# Patient Record
Sex: MALE | Race: BLACK OR AFRICAN AMERICAN
[De-identification: names, ages, dates, MRNs, and addresses within clinical notes are randomized per-mention and may not be internally consistent; named-entity substitution may affect disease eponyms.]

---

## 2018-08-16 ENCOUNTER — HOSPITAL ENCOUNTER (EMERGENCY)
Dept: HOSPITAL 97 - ER | Age: 28
Discharge: HOME | End: 2018-08-16
Payer: COMMERCIAL

## 2018-08-16 DIAGNOSIS — E86.0: Primary | ICD-10-CM

## 2018-08-16 LAB
ALBUMIN SERPL BCP-MCNC: 4.7 G/DL (ref 3.4–5)
ALP SERPL-CCNC: 91 U/L (ref 45–117)
ALT SERPL W P-5'-P-CCNC: 36 U/L (ref 12–78)
AMYLASE SERPL-CCNC: 63 U/L (ref 25–115)
AST SERPL W P-5'-P-CCNC: 36 U/L (ref 15–37)
BUN BLD-MCNC: 15 MG/DL (ref 7–18)
BUN BLD-MCNC: 18 MG/DL (ref 7–18)
CAOX CRY URNS QL MICRO: (no result)
GLUCOSE SERPLBLD-MCNC: 102 MG/DL (ref 74–106)
GLUCOSE SERPLBLD-MCNC: 96 MG/DL (ref 74–106)
HCT VFR BLD CALC: 44.4 % (ref 39.6–49)
LIPASE SERPL-CCNC: 126 U/L (ref 73–393)
LYMPHOCYTES # SPEC AUTO: 1.8 K/UL (ref 0.7–4.9)
MCH RBC QN AUTO: 32 PG (ref 27–35)
MCV RBC: 91.5 FL (ref 80–100)
PMV BLD: 8.1 FL (ref 7.6–11.3)
POTASSIUM SERPL-SCNC: 3.9 MMOL/L (ref 3.5–5.1)
POTASSIUM SERPL-SCNC: 4.3 MMOL/L (ref 3.5–5.1)
RBC # BLD: 4.85 M/UL (ref 4.33–5.43)
UA COMPLETE W REFLEX CULTURE PNL UR: (no result)

## 2018-08-16 PROCEDURE — 99284 EMERGENCY DEPT VISIT MOD MDM: CPT

## 2018-08-16 PROCEDURE — 96361 HYDRATE IV INFUSION ADD-ON: CPT

## 2018-08-16 PROCEDURE — 80048 BASIC METABOLIC PNL TOTAL CA: CPT

## 2018-08-16 PROCEDURE — 96374 THER/PROPH/DIAG INJ IV PUSH: CPT

## 2018-08-16 PROCEDURE — 76377 3D RENDER W/INTRP POSTPROCES: CPT

## 2018-08-16 PROCEDURE — 74176 CT ABD & PELVIS W/O CONTRAST: CPT

## 2018-08-16 PROCEDURE — 82550 ASSAY OF CK (CPK): CPT

## 2018-08-16 PROCEDURE — 82150 ASSAY OF AMYLASE: CPT

## 2018-08-16 PROCEDURE — 81003 URINALYSIS AUTO W/O SCOPE: CPT

## 2018-08-16 PROCEDURE — 80076 HEPATIC FUNCTION PANEL: CPT

## 2018-08-16 PROCEDURE — 36415 COLL VENOUS BLD VENIPUNCTURE: CPT

## 2018-08-16 PROCEDURE — 83690 ASSAY OF LIPASE: CPT

## 2018-08-16 PROCEDURE — 81015 MICROSCOPIC EXAM OF URINE: CPT

## 2018-08-16 PROCEDURE — 85025 COMPLETE CBC W/AUTO DIFF WBC: CPT

## 2018-08-16 NOTE — EDPHYS
Physician Documentation                                                                           

 [facility]                                                                

Name: Kelvin Hopkins                                                                               

Age: 27 yrs                                                                                       

Sex: Male                                                                                         

: 1990                                                                                   

MRN: [id number]                                                                                   

Arrival Date: 2018                                                                          

Time: 19:04                                                                                       

Account#: [id number]                                                                            

Bed 25                                                                                            

Private MD: None, None                                                                            

ED Physician Nehemiah Dawn                                                                      

HPI:                                                                                              

                                                                                             

19:53 This 27 yrs old Black Male presents to ER via Ambulatory with complaints of             jmm 

      Vomiting/Fatigue.                                                                           

19:53 Onset: The symptoms/episode began/occurred gradually, 2 week(s) ago. This is a 27 year  jmm 

      old male with no chronic medical conditions that presents to the ED with fatigue,           

      vomiting, left flank pain beginning approx 2 weeks ago. The patient states that he          

      works outside and believes he may be dehydrated. Patient denies abdominal pain but          

      states that he will develop left flank pain with cramping. Patient states he has not        

      urinated today. .                                                                           

                                                                                                  

Historical:                                                                                       

- Allergies:                                                                                      

19:10 No Known Allergies;                                                                     aj  

- Home Meds:                                                                                      

19:10 None [Active];                                                                          aj  

- PMHx:                                                                                           

19:10 None;                                                                                   aj  

- PSHx:                                                                                           

19:10 None;                                                                                   aj  

                                                                                                  

- Immunization history:: Adult Immunizations up to date.                                          

- Social history:: Smoking status: Patient/guardian denies using tobacco.                         

- Ebola Screening: : Patient negative for fever greater than or equal to 101.5 degrees            

  Fahrenheit, and additional compatible Ebola Virus Disease symptoms Patient denies               

  exposure to infectious person Patient denies travel to an Ebola-affected area in the            

  21 days before illness onset No symptoms or risks identified at this time.                      

                                                                                                  

                                                                                                  

ROS:                                                                                              

19:57 Cardiovascular: Negative for chest pain, palpitations, and edema, Respiratory: Negative jmm 

      for shortness of breath, cough, wheezing, and pleuritic chest pain.                         

19:57 Constitutional: Positive for fatigue.                                                       

19:57 Abdomen/GI: Positive for nausea and vomiting, flank pain.                                   

19:57 Neuro: Positive for weakness.                                                               

19:57 All other systems are negative.                                                             

                                                                                                  

Exam:                                                                                             

19:57 Constitutional:  This is a well developed, well nourished patient who is awake, alert,  jmm 

      and in no acute distress. Head/Face:  atraumatic. Chest/axilla:  Normal chest wall          

      appearance and motion.   Cardiovascular:  Regular rate and rhythm.  No edema                

      appreciated Respiratory:  Normal respirations, no respiratory distress appreciated          

      Abdomen/GI:  Non distended, soft                                                            

19:57 Back: CVA tenderness, is absent.                                                            

19:57 Musculoskeletal/extremity: ROM: intact in all extremities.                                  

19:57 Skin: Appearance: Color: normal in color.                                                   

19:57 Neuro: Orientation: is normal, Mentation: is normal, Memory: is normal.                     

19:57 Psych: Behavior/mood is pleasant, cooperative.                                              

                                                                                                  

Vital Signs:                                                                                      

19:10  / 95; Pulse 92; Resp 16; Temp 99.0; Pulse Ox 99% on R/A; Weight 92.99 kg; Height aj  

      5 ft. 11 in. (180.34 cm) (M);                                                               

20:59  / 83; Pulse 81; Resp 17; Pulse Ox 100% on R/A; Pain 2/10;                        ed1 

22:00  / 80; Pulse 76; Resp 18; Pulse Ox 100% on R/A; Pain 4/10;                        ed1 

23:13  / 76; Pulse 83; Resp 17; Pulse Ox 100% on R/A; Pain 4/10;                        ed1 

19:10 Body Mass Index 28.59 (92.99 kg, 180.34 cm)                                             aj  

                                                                                                  

MDM:                                                                                              

19:52 Patient medically screened.                                                             [facility] 

22:37 Data reviewed: vital signs, nurses notes, lab test result(s), radiologic studies, CT    [facility] 

      scan. Transition of care: After a detail discussion of the patient's case, care is          

      transferred to Nehemiha CASTILLO.                                                               

                                                                                                  

                                                                                             

19:53 Order name: Amylase, Serum; Complete Time: 21:13                                        [facility] 

                                                                                             

19:53 Order name: Basic Metabolic Panel; Complete Time: 21:13                                 [facility] 

                                                                                             

19:53 Order name: CBC with Diff; Complete Time: 20:40                                         [facility] 

                                                                                             

19:53 Order name: Creatinine for Radiology; Complete Time: 21:13                              [facility] 

                                                                                             

19:53 Order name: Hepatic Function; Complete Time: 21:13                                      [facility] 

                                                                                             

19:53 Order name: Lipase; Complete Time: 21:13                                                [facility] 

                                                                                             

19:53 Order name: Urine Microscopic Only; Complete Time: 21:13                                [facility] 

                                                                                             

19:53 Order name: CPK; Complete Time: 21:13                                                   [facility] 

                                                                                             

20:48 Order name: Urine Dipstick--Ancillary (enter results); Complete Time: 21:13             ms  

                                                                                             

21:19 Order name: CT Stone Protocol; Complete Time: 21:51                                     [facility] 

                                                                                             

22:10 Order name: BMP; Complete Time: 23:24                                                   [facility] 

                                                                                             

19:53 Order name: IV Saline Lock; Complete Time: 20:29                                        [facility] 

                                                                                             

19:53 Order name: Labs collected and sent; Complete Time: 20:29                               [facility] 

                                                                                             

19:53 Order name: Urine Dipstick-Ancillary (obtain specimen); Complete Time: 20:29            [facility] 

                                                                                                  

Administered Medications:                                                                         

20:26 Drug: NS 0.9% 1000 ml Route: IV; Rate: 1 bolus; Site: left antecubital;                 ed1 

21:24 Follow up: IV Status: Completed infusion; IV Intake: 1000ml                             ed1 

20:28 Drug: Zofran 4 mg Route: IVP; Site: left antecubital;                                   bb  

21:10 Follow up: Response: No adverse reaction; Nausea is decreased                           ed1 

21:24 Drug: NS 0.9% 1000 ml Route: IV; Rate: 1 bolus; Site: left antecubital;                 ed1 

23:49 Follow up: IV Status: Completed infusion                                                rv  

                                                                                                  

                                                                                                  

Disposition:                                                                                      

                                                                                             

07:27 Co-signature as Attending Physician, Nehemiah Dawn MD I agree with the assessment and  [facility] 

      plan of care.                                                                               

                                                                                                  

Disposition:                                                                                      

18 23:28 Discharged to Home. Impression: Dehydration.                                       

- Condition is Stable.                                                                            

- Discharge Instructions: Dehydration, Adult, Form - Excuse from Work, School, or                 

  Physical Activity.                                                                              

                                                                                                  

- Medication Reconciliation Form, Thank You Letter, Antibiotic Education, Prescription            

  Opioid Use, Work release form form.                                                             

- Follow up: Private Physician; When: 1 - 2 days; Reason: Recheck today's complaints.             

- Problem is new.                                                                                 

- Symptoms have improved.                                                                         

                                                                                                  

                                                                                                  

                                                                                                  

Signatures:                                                                                       

Dispatcher [employer]                           EDMS                                                 

Sofia Olmedo RN RN aj Anderson, Corey, MD MD cha Mickail, Joel, PA PA jmm Ballard, Brenda RN                     RN   Maria A Garcia, LUCYN                       LVN  ed1                                                  

Nehemiah Joy PA PA cp Vicente, Ronaldo, RN                    RN   rv                                                   

                                                                                                  

Corrections: (The following items were deleted from the chart)                                    

                                                                                             

23:49 23:28 2018 23:28 Discharged to Home. Impression: Dehydration. Condition is        rv  

      Stable. Forms are Medication Reconciliation Form, Thank You Letter, Antibiotic              

      Education, Prescription Opioid Use. Follow up: Private Physician; When: 1 - 2 days;         

      Reason: Recheck today's complaints. Problem is new. Symptoms have improved. cp              

                                                                                                  

**************************************************************************************************

## 2018-08-16 NOTE — RAD REPORT
EXAM DESCRIPTION:  CT - Stone Protocol - 8/16/2018 9:30 pm

 

CLINICAL HISTORY:  Flank pain.

FLANK PAIN

 

COMPARISON:  No comparisons

 

TECHNIQUE:  Axial images were obtained without oral or IV contrast. Lack of contrast limits solid org
an and vascular assessment. The field-of-view spans the entirety of the  system partially obscuring
 uppermost abdomen and lung bases. Coronal reformatted images were obtained and reviewed.

 

All CT scans are performed using dose optimization technique as appropriate and may include automated
 exposure control or mA/KV adjustment according to patient size.

 

FINDINGS:  The lower lung fields are clear.

 

Imaged portions of the liver and spleen show no suspicious findings on non-contrast imaging. The panc
reas and adrenal glands are normal. No pathologic lymphadenopathy in the abdomen or pelvis.

 

No urinary tract stones or obstructive uropathy.

 

No bowel obstruction, free air, free fluid or abscess. Normal appendix noted.

 

No significant bony abnormality.

 

IMPRESSION:  No urinary tract stones or obstructive uropathy.

## 2018-08-16 NOTE — ER
Nurse's Notes                                                                                     

 [facility]                                                                

Name: Kelvin Hopkins                                                                               

Age: 27 yrs                                                                                       

Sex: Male                                                                                         

: 1990                                                                                   

MRN: [id number]                                                                                   

Arrival Date: 2018                                                                          

Time: 19:04                                                                                       

Account#: [id number]                                                                            

Bed 25                                                                                            

Private MD: None, None                                                                            

Diagnosis: Dehydration                                                                            

                                                                                                  

Presentation:                                                                                     

                                                                                             

19:08 Presenting complaint: Patient states: Reports fatigue and muscle cramping that occurs   aj  

      "usually in the middle of the week, like Wednesday or Thursday every week" for 1 month.     

      Ambulated to triage with steady gait, in NAD. A+O x3. Transition of care: patient was       

      not received from another setting of care. Onset of symptoms was Ana 15, 2018. Risk        

      Assessment: Do you want to hurt yourself or someone else? Patient reports no desire to      

      harm self or others. Initial Sepsis Screen: Does the patient meet any 2 criteria? No.       

      Patient's initial sepsis screen is negative. Does the patient have a suspected source       

      of infection? No. Patient's initial sepsis screen is negative. Care prior to arrival:       

      None.                                                                                       

19:08 Method Of Arrival: Ambulatory                                                             

19:08 Acuity: BUZZ 4                                                                           aj  

                                                                                                  

Triage Assessment:                                                                                

19:10 General: Appears in no apparent distress. comfortable, Behavior is calm, cooperative,   aj  

      appropriate for age. Pain: Denies pain. Neuro: Level of Consciousness is awake, alert,      

      obeys commands, Oriented to person, place, time, situation, Appropriate for age.            

      Respiratory: Airway is patent Respiratory effort is even, unlabored, Respiratory            

      pattern is regular, symmetrical. Derm: Skin is intact, is healthy with good turgor,         

      Skin is pink, warm \T\ dry. normal.                                                         

                                                                                                  

Historical:                                                                                       

- Allergies:                                                                                      

19:10 No Known Allergies;                                                                     aj  

- Home Meds:                                                                                      

19:10 None [Active];                                                                          aj  

- PMHx:                                                                                           

19:10 None;                                                                                   aj  

- PSHx:                                                                                           

19:10 None;                                                                                   aj  

                                                                                                  

- Immunization history:: Adult Immunizations up to date.                                          

- Social history:: Smoking status: Patient/guardian denies using tobacco.                         

- Ebola Screening: : Patient negative for fever greater than or equal to 101.5 degrees            

  Fahrenheit, and additional compatible Ebola Virus Disease symptoms Patient denies               

  exposure to infectious person Patient denies travel to an Ebola-affected area in the            

  21 days before illness onset No symptoms or risks identified at this time.                      

                                                                                                  

                                                                                                  

Screenin:48 Abuse screen: Denies threats or abuse. Denies injuries from another. Nutritional        ed1 

      screening: No deficits noted. Tuberculosis screening: No symptoms or risk factors           

      identified. Fall Risk None identified.                                                      

                                                                                                  

Assessment:                                                                                       

19:48 General: Appears in no apparent distress. Behavior is calm, cooperative, Pt ambulatory  ed1 

      to exam room with steady gait. Pain: Complains of pain in generalized Pain does not         

      radiate. Pain currently is 4 out of 10 on a pain scale. Quality of pain is described as     

      aching, crampy, Pain began 1 day ago. Is continuous. Neuro: Level of Consciousness is       

      awake, alert, obeys commands, Oriented to person, place, time, situation,  are         

      equal bilaterally Moves all extremities. Full function Gait is steady, Speech is            

      normal, Facial symmetry appears normal, Pupils are PERRLA, Reports weakness.                

      Cardiovascular: Denies chest pain, Heart tones S1 S2 present. Respiratory: Airway is        

      patent Respiratory effort is even, unlabored, Respiratory pattern is regular,               

      symmetrical, Breath sounds are clear bilaterally. GI: No signs and/or symptoms were         

      reported involving the gastrointestinal system. : No signs and/or symptoms were           

      reported regarding the genitourinary system. EENT: No signs and/or symptoms were            

      reported regarding the EENT system. Derm: Skin is intact, is healthy with good turgor,      

      Skin is dry, Skin is normal, Skin temperature is warm. Musculoskeletal: Circulation,        

      motion, and sensation intact.                                                               

20:00 Reassessment: I agree with this assessment.                                             bb  

20:52 Reassessment: Patient appears in no apparent distress at this time. No changes from     ed1 

      previously documented assessment. Patient and/or family updated on plan of care and         

      expected duration. Pain level reassessed. Patient is alert, oriented x 3, equal             

      unlabored respirations, skin warm/dry/pink.                                                 

22:00 Reassessment: Patient appears in no apparent distress at this time. No changes from     ed1 

      previously documented assessment. Patient and/or family updated on plan of care and         

      expected duration. Pain level reassessed. Patient is alert, oriented x 3, equal             

      unlabored respirations, skin warm/dry/pink.                                                 

23:13 Reassessment: Patient appears in no apparent distress at this time. No changes from     ed1 

      previously documented assessment. Patient and/or family updated on plan of care and         

      expected duration. Pain level reassessed. Patient is alert, oriented x 3, equal             

      unlabored respirations, skin warm/dry/pink.                                                 

                                                                                                  

Vital Signs:                                                                                      

19:10  / 95; Pulse 92; Resp 16; Temp 99.0; Pulse Ox 99% on R/A; Weight 92.99 kg; Height aj  

      5 ft. 11 in. (180.34 cm) (M);                                                               

20:59  / 83; Pulse 81; Resp 17; Pulse Ox 100% on R/A; Pain 2/10;                        ed1 

22:00  / 80; Pulse 76; Resp 18; Pulse Ox 100% on R/A; Pain 4/10;                        ed1 

23:13  / 76; Pulse 83; Resp 17; Pulse Ox 100% on R/A; Pain 4/10;                        ed1 

19:10 Body Mass Index 28.59 (92.99 kg, 180.34 cm)                                             aj  

                                                                                                  

ED Course:                                                                                        

19:04 Patient arrived in ED.                                                                  sb2 

19:05 None, None is Private Physician.                                                        sb2 

19:10 Triage completed.                                                                       aj  

19:10 Arm band placed on left wrist. Patient placed in an exam room, Patient notified of wait aj  

      time.                                                                                       

19:40 Maria A Deleon LVN is Primary Nurse.                                                     ed1 

19:43 Jerrod Herron PA is PHCP.                                                              [facility] 

19:43 Nehemiah Dawn MD is Attending Physician.                                             [facility] 

19:48 Jerrod at bedside to examine patient.                                                     ed1 

19:48 Patient has correct armband on for positive identification. Bed in low position. Call   ed1 

      light in reach.                                                                             

20:26 Initial lab(s) drawn, by me, sent to lab. Inserted saline lock: 20 gauge in left        ed1 

      antecubital area, using aseptic technique. Blood collected.                                 

20:30 Urine collected: clean catch specimen, clear, arcadio colored.                            jp3 

21:22 Patient moved to CT.                                                                    nj  

21:29 CT completed. Patient tolerated procedure well. Patient moved back from CT.             nj  

21:31 CT Stone Protocol In Process Unspecified.                                               EDMS

22:40 PHCP role handed off by Jerrod Herron PA                                               cp  

22:40 Nehemiah Joy PA is PHCP.                                                                cp  

23:13 No provider procedures requiring assistance completed.                                  ed1 

23:18 Primary Nurse role handed off by Maria A Deleon LVN                                      ed1 

23:49 IV discontinued, bleeding controlled, No redness/swelling at site. Pressure dressing    rv  

      applied.                                                                                    

                                                                                                  

Administered Medications:                                                                         

20:26 Drug: NS 0.9% 1000 ml Route: IV; Rate: 1 bolus; Site: left antecubital;                 ed1 

21:24 Follow up: IV Status: Completed infusion; IV Intake: 1000ml                             ed1 

20:28 Drug: Zofran 4 mg Route: IVP; Site: left antecubital;                                   bb  

21:10 Follow up: Response: No adverse reaction; Nausea is decreased                           ed1 

21:24 Drug: NS 0.9% 1000 ml Route: IV; Rate: 1 bolus; Site: left antecubital;                 ed1 

23:49 Follow up: IV Status: Completed infusion                                                rv  

                                                                                                  

                                                                                                  

Intake:                                                                                           

21:24 IV: 1000ml; Total: 1000ml.                                                              ed1 

                                                                                                  

Outcome:                                                                                          

23:28 Discharge ordered by MD.                                                                cp  

23:49 Discharged to home ambulatory.                                                          rv  

23:49 Condition: improved                                                                         

23:49 Discharge instructions given to patient, Instructed on discharge instructions, follow       

      up and referral plans.                                                                      

23:49 Patient left the ED.                                                                    rv  

                                                                                                  

Signatures:                                                                                       

Dispatcher [employer]                           EDMS                                                 

Sofia Olmedo, RN                       RN   Jerrod Acuna PA                       PA   jmKaren Franco RN                     RN   bb                                                   

Maria A Deleon, LUCYN                       LVN  ed1                                                  

Nehemiah Joy PA                         PA   Nathan Donato Sheri                               sb2                                                  

Torey Johnson RN                    RN   rv                                                   

Johan Trevino                              jp3                                                  

                                                                                                  

**************************************************************************************************